# Patient Record
Sex: MALE | Race: WHITE | URBAN - METROPOLITAN AREA
[De-identification: names, ages, dates, MRNs, and addresses within clinical notes are randomized per-mention and may not be internally consistent; named-entity substitution may affect disease eponyms.]

---

## 2020-05-26 ENCOUNTER — OFFICE VISIT (OUTPATIENT)
Dept: URBAN - METROPOLITAN AREA CLINIC 48 | Facility: CLINIC | Age: 75
End: 2020-05-26
Payer: COMMERCIAL

## 2020-05-26 DIAGNOSIS — H40.013 OPEN ANGLE WITH BORDERLINE FINDINGS, LOW RISK, BILATERAL: ICD-10-CM

## 2020-05-26 DIAGNOSIS — H26.491 OTHER SECONDARY CATARACT, RIGHT EYE: Primary | ICD-10-CM

## 2020-05-26 DIAGNOSIS — H25.12 AGE-RELATED NUCLEAR CATARACT, LEFT EYE: ICD-10-CM

## 2020-05-26 DIAGNOSIS — H35.373 PUCKERING OF MACULA, BILATERAL: ICD-10-CM

## 2020-05-26 DIAGNOSIS — H53.2 DIPLOPIA: ICD-10-CM

## 2020-05-26 PROCEDURE — 92004 COMPRE OPH EXAM NEW PT 1/>: CPT | Performed by: OPHTHALMOLOGY

## 2020-05-26 ASSESSMENT — VISUAL ACUITY
OS: 20/30
OD: 20/50

## 2020-05-26 ASSESSMENT — KERATOMETRY
OS: 43.75
OD: 43.63

## 2020-05-26 ASSESSMENT — INTRAOCULAR PRESSURE
OS: 12
OD: 15

## 2020-05-26 NOTE — IMPRESSION/PLAN
Impression: Age-related nuclear cataract, left eye: H25.12. Plan: The patient has a visually significant cataract in the left eye. After discussion with the patient and careful examination it has been determined that a cataract in the left eye is accounting for a significant amount of the patient's visual symptoms. Cataract surgery and the associated risks, benefits, alternatives, expectations, and recovery were discussed in detail with the patient. All questions were answered. The patient understands that there may be some limitation in visual potential given any pre-existing ocular disease. The patient desires cataract surgery in the left eye. patient elects Prednisolone, Ketorolac and Ofloxacin drops for surgery, patient understands side effects. Schedule cataract surgery in the left eye. RL 3 Patient not a candidate for multifoical but candidate for Toric lens work up if desired by patient.

## 2020-05-26 NOTE — IMPRESSION/PLAN
Impression: Dermatochalasis of right upper eyelid: H02.831.  Plan: Refer to Dr. Bandar Stratton with VF ptosis prior

## 2020-05-26 NOTE — IMPRESSION/PLAN
Impression: Other secondary cataract, right eye: H26.491. Plan: Risks, benefits, alternatives, and expectations of YAG capsulotomy were discussed. The patient desires a YAG capsulotomy in the right eye. Schedule YAG capsulotomy in the right eye, with 1-2 day PO1.

## 2020-05-26 NOTE — IMPRESSION/PLAN
Impression: Open angle with borderline findings, low risk, bilateral: H40.013. Plan: Continue to monitor off medication. 


RTC 3 months IOP check with VF 24-2 and OCT nerve prior with Dr. Carlyle Araujo ( long exam)

## 2020-05-26 NOTE — IMPRESSION/PLAN
Impression: Diplopia: H53.2. Primary gaze: Ortho Plan: Patient states he has had this since 2years old, continue to monitor. 

RTC 6 months follow up ( long exam)

## 2020-07-07 ENCOUNTER — TESTING ONLY (OUTPATIENT)
Dept: URBAN - METROPOLITAN AREA CLINIC 48 | Facility: CLINIC | Age: 75
End: 2020-07-07
Payer: COMMERCIAL

## 2020-07-07 PROCEDURE — 92136 OPHTHALMIC BIOMETRY: CPT | Performed by: OPHTHALMOLOGY

## 2020-07-07 ASSESSMENT — PACHYMETRY
OD: 23.79
OS: 2.91
OD: 4.59
OS: 23.71

## 2020-07-21 ENCOUNTER — SURGERY (OUTPATIENT)
Dept: URBAN - METROPOLITAN AREA SURGERY 26 | Facility: SURGERY | Age: 75
End: 2020-07-21
Payer: COMMERCIAL

## 2020-07-21 PROCEDURE — 66984 XCAPSL CTRC RMVL W/O ECP: CPT | Performed by: OPHTHALMOLOGY

## 2020-07-22 ENCOUNTER — POST-OPERATIVE VISIT (OUTPATIENT)
Dept: URBAN - METROPOLITAN AREA CLINIC 48 | Facility: CLINIC | Age: 75
End: 2020-07-22

## 2020-07-22 DIAGNOSIS — Z48.810 ENCNTR FOR SURGICAL AFTCR FOL SURGERY ON THE SENSE ORGANS: ICD-10-CM

## 2020-07-22 PROCEDURE — 99024 POSTOP FOLLOW-UP VISIT: CPT | Performed by: OPTOMETRIST

## 2020-07-22 ASSESSMENT — INTRAOCULAR PRESSURE: OS: 14

## 2020-07-28 ENCOUNTER — POST-OPERATIVE VISIT (OUTPATIENT)
Dept: URBAN - METROPOLITAN AREA CLINIC 48 | Facility: CLINIC | Age: 75
End: 2020-07-28

## 2020-07-28 DIAGNOSIS — Z09 ENCNTR FOR F/U EXAM AFT TRTMT FOR COND OTH THAN MALIG NEOPLM: Primary | ICD-10-CM

## 2020-07-28 PROCEDURE — 99024 POSTOP FOLLOW-UP VISIT: CPT | Performed by: OPTOMETRIST

## 2020-07-28 ASSESSMENT — INTRAOCULAR PRESSURE
OS: 14
OD: 10

## 2020-08-20 ENCOUNTER — SURGERY (OUTPATIENT)
Dept: URBAN - METROPOLITAN AREA SURGERY 26 | Facility: SURGERY | Age: 75
End: 2020-08-20
Payer: COMMERCIAL

## 2020-08-20 PROCEDURE — 66821 AFTER CATARACT LASER SURGERY: CPT | Performed by: OPHTHALMOLOGY

## 2020-08-28 ENCOUNTER — OFFICE VISIT (OUTPATIENT)
Dept: URBAN - METROPOLITAN AREA CLINIC 48 | Facility: CLINIC | Age: 75
End: 2020-08-28
Payer: COMMERCIAL

## 2020-08-28 DIAGNOSIS — H02.834 DERMATOCHALASIS OF LT UPPER EYELID: ICD-10-CM

## 2020-08-28 PROCEDURE — 92014 COMPRE OPH EXAM EST PT 1/>: CPT | Performed by: OPTOMETRIST

## 2020-08-28 PROCEDURE — 92083 EXTENDED VISUAL FIELD XM: CPT | Performed by: OPTOMETRIST

## 2020-08-28 PROCEDURE — 92133 CPTRZD OPH DX IMG PST SGM ON: CPT | Performed by: OPTOMETRIST

## 2020-08-28 ASSESSMENT — INTRAOCULAR PRESSURE
OD: 14
OS: 14

## 2020-08-28 NOTE — IMPRESSION/PLAN
Impression: Open angle with borderline findings, low risk, bilateral: H40.013. Plan: Discuss with patient, nature of disease and associated risk. 

RTC 6 month IOP, VF 24-2, OCT-ON, pachs, OCT Mac, and 5 line raster (prior)

## 2020-08-28 NOTE — IMPRESSION/PLAN
Impression: Dermatochalasis of lt upper eyelid: H02.834. Plan: Discussed with patient.  

RTC 2-3 week lid suyapa, T/UT with Dr. Abhishek Spencer

## 2020-10-06 ENCOUNTER — OFFICE VISIT (OUTPATIENT)
Dept: URBAN - METROPOLITAN AREA CLINIC 48 | Facility: CLINIC | Age: 75
End: 2020-10-06
Payer: COMMERCIAL

## 2020-10-06 DIAGNOSIS — H02.831 DERMATOCHALASIS OF RIGHT UPPER EYELID: Primary | ICD-10-CM

## 2020-10-06 PROCEDURE — 99214 OFFICE O/P EST MOD 30 MIN: CPT | Performed by: OPHTHALMOLOGY

## 2020-10-06 PROCEDURE — 92285 EXTERNAL OCULAR PHOTOGRAPHY: CPT | Performed by: OPHTHALMOLOGY

## 2020-10-06 PROCEDURE — 92081 LIMITED VISUAL FIELD XM: CPT | Performed by: OPHTHALMOLOGY

## 2020-10-06 RX ORDER — ERYTHROMYCIN 5 MG/G
OINTMENT OPHTHALMIC
Qty: 2 | Refills: 0 | Status: ACTIVE
Start: 2020-10-06

## 2020-10-06 RX ORDER — CEPHALEXIN 500 MG/1
500 MG CAPSULE ORAL
Qty: 15 | Refills: 0 | Status: ACTIVE
Start: 2020-10-06

## 2020-10-06 NOTE — IMPRESSION/PLAN
Impression: Dermatochalasis of right upper eyelid: H02.831. Photo Interpretation: supports clinical findings and dx documented in chart. Plan: Discussed diagnosis in detail with patient. Discussed treatment options with patient. Surgical risks and benefits were discussed, explained and understood by patient. Surgical treatment is required. Patient elects to proceed with surgery. HVF 36 superior taped and untaped ordered/reviewed today. If condition is visually significant, Recommend Bilateral Upper Eyelid Blepharoplasty. RL3. *Needs Medical Clearance from Cardiologist due to history of A Fib. Must include stop and start dates for Warfarin. *Patient is currently being treated during COVID-19 epidemic, which limits our availability to establish proper follow up care. Patient understands these limitations, and is aware that we will continue treatment and follow up based on our availability, as determined by local state and federal guidelines.

## 2020-10-06 NOTE — ASSESSMENT/PLAN
Impression: Visual Field - OD: Poor-Untaped: 20 degrees of superior isopter visual field obstruction; Taped: improved visual field obstruction; OS: Poor-Untaped: 20 degrees of superior isopter visual field obstruction; Taped: improved visual field obstruction Plan: See plan #1.

## 2020-11-23 ENCOUNTER — SURGERY (OUTPATIENT)
Dept: URBAN - METROPOLITAN AREA SURGERY 26 | Facility: SURGERY | Age: 75
End: 2020-11-23
Payer: COMMERCIAL

## 2020-11-23 RX ORDER — TRAMADOL HYDROCHLORIDE 50 MG/1
50 MG TABLET, FILM COATED ORAL
Qty: 20 | Refills: 0 | Status: INACTIVE
Start: 2020-11-23 | End: 2020-11-27

## 2020-11-25 ENCOUNTER — POST-OPERATIVE VISIT (OUTPATIENT)
Dept: URBAN - METROPOLITAN AREA CLINIC 48 | Facility: CLINIC | Age: 75
End: 2020-11-25
Payer: COMMERCIAL

## 2020-11-25 DIAGNOSIS — Z48.817: Primary | ICD-10-CM

## 2020-11-25 PROCEDURE — 99024 POSTOP FOLLOW-UP VISIT: CPT | Performed by: STUDENT IN AN ORGANIZED HEALTH CARE EDUCATION/TRAINING PROGRAM

## 2020-11-25 ASSESSMENT — INTRAOCULAR PRESSURE
OS: 18
OD: 17

## 2020-11-25 NOTE — IMPRESSION/PLAN
Impression: Encounter for surgical aftercare following surgery on the skin: Z48.817. Plan: Incisions healing well with mild bleeding, incisions C/D/I, no dehiscence Continue icing surgical areas no more than 15 inutes every hour for the first 48 hours Continue erythromycin carlos TID to surgical site for 1 week, then qhs until gone Postop precuations reviewed with patient RTC 1 week as scheduled

## 2020-12-01 ENCOUNTER — POST-OPERATIVE VISIT (OUTPATIENT)
Dept: URBAN - METROPOLITAN AREA CLINIC 48 | Facility: CLINIC | Age: 75
End: 2020-12-01
Payer: COMMERCIAL

## 2020-12-01 DIAGNOSIS — Z48.89 ENCOUNTER FOR OTHER SPECIFIED SURGICAL AFTERCARE: Primary | ICD-10-CM

## 2020-12-01 PROCEDURE — 99024 POSTOP FOLLOW-UP VISIT: CPT | Performed by: OPHTHALMOLOGY

## 2020-12-01 ASSESSMENT — INTRAOCULAR PRESSURE
OD: 15
OS: 12

## 2020-12-01 NOTE — IMPRESSION/PLAN
Impression: S/P Ptosis  - 8 Days. Encounter for other specified surgical aftercare  Z48.89. Plan: *Patient is currently being treated during COVID-19 epidemic, which limits our availability to establish proper follow up care. Patient understands these limitations, and is aware that we will continue treatment and follow up based on our availability, as determined by local state and federal guidelines. -- Patient to continue Erythromycin carlos at night, for two weeks -- Patient to begin warm compresses BID, for two weeks -- Patient instructed to wash with mild soap and warm water -- Patient to gradually resume normal activities -- Expressed importance of UV Protection

## 2021-02-09 ENCOUNTER — POST-OPERATIVE VISIT (OUTPATIENT)
Dept: URBAN - METROPOLITAN AREA CLINIC 48 | Facility: CLINIC | Age: 76
End: 2021-02-09
Payer: COMMERCIAL

## 2021-02-09 PROCEDURE — 99024 POSTOP FOLLOW-UP VISIT: CPT | Performed by: OPHTHALMOLOGY

## 2021-02-09 NOTE — IMPRESSION/PLAN
Impression: S/P BULB OU - 78 Days. Encounter for other specified surgical aftercare  Z48.89. Post operative instructions reviewed - Plan: *Patient is currently being treated during COVID-19 epidemic, which limits our availability to establish proper follow up care. Patient understands these limitations, and is aware that we will continue treatment and follow up based on our availability, as determined by local state and federal guidelines. -- Patient to gradually resume normal activities -- Expressed importance of UV Protection -- Advised patient to not rub eyes and use warm compresses

## 2023-01-31 ENCOUNTER — OFFICE VISIT (OUTPATIENT)
Dept: URBAN - METROPOLITAN AREA CLINIC 48 | Facility: CLINIC | Age: 78
End: 2023-01-31
Payer: COMMERCIAL

## 2023-01-31 DIAGNOSIS — H26.492 OTHER SECONDARY CATARACT, LEFT EYE: ICD-10-CM

## 2023-01-31 DIAGNOSIS — H02.831 DERMATOCHALASIS OF RIGHT UPPER EYELID: ICD-10-CM

## 2023-01-31 DIAGNOSIS — H35.373 PUCKERING OF MACULA, BILATERAL: Primary | ICD-10-CM

## 2023-01-31 DIAGNOSIS — H02.834 DERMATOCHALASIS OF LEFT UPPER EYELID: ICD-10-CM

## 2023-01-31 PROCEDURE — 92134 CPTRZ OPH DX IMG PST SGM RTA: CPT | Performed by: OPHTHALMOLOGY

## 2023-01-31 PROCEDURE — 99214 OFFICE O/P EST MOD 30 MIN: CPT | Performed by: OPHTHALMOLOGY

## 2023-01-31 ASSESSMENT — INTRAOCULAR PRESSURE
OD: 15
OS: 12

## 2023-01-31 NOTE — IMPRESSION/PLAN
Impression: Other secondary cataract, left eye: H26.492. Plan: Patient reports double vision, Recommend 1-2wk Diplopia work up w/ YAG Eval same day If patient symptoms do not improve with YAG, will move up ERM eval with Dr. Glenis Bassett

## 2023-01-31 NOTE — IMPRESSION/PLAN
Impression: Puckering of macula, bilateral: H35.373. Plan: Unsure if this is cause of visual disturbances.  Recommend eval with Dr. Carri Henry

RTC 2mo ERM Eval w/ Dr. Carri Henry

## 2023-02-15 ENCOUNTER — OFFICE VISIT (OUTPATIENT)
Dept: URBAN - METROPOLITAN AREA CLINIC 48 | Facility: CLINIC | Age: 78
End: 2023-02-15
Payer: COMMERCIAL

## 2023-02-15 DIAGNOSIS — H53.2 DIPLOPIA: ICD-10-CM

## 2023-02-15 DIAGNOSIS — H35.373 PUCKERING OF MACULA, BILATERAL: ICD-10-CM

## 2023-02-15 DIAGNOSIS — H26.493 OTHER SECONDARY CATARACT, BILATERAL: Primary | ICD-10-CM

## 2023-02-15 PROCEDURE — 99214 OFFICE O/P EST MOD 30 MIN: CPT | Performed by: OPHTHALMOLOGY

## 2023-02-15 ASSESSMENT — VISUAL ACUITY
OS: 20/30
OD: 20/30

## 2023-02-15 ASSESSMENT — INTRAOCULAR PRESSURE
OD: 14
OS: 10

## 2023-02-15 ASSESSMENT — KERATOMETRY
OD: 43.50
OS: 43.50

## 2023-02-15 NOTE — IMPRESSION/PLAN
Impression: Diplopia: H53.2. Plan: Ortho on all ACT. Will continue to monitor, may be due to Other secondary cataract.

## 2023-02-15 NOTE — IMPRESSION/PLAN
Impression: Other secondary cataract, bilateral: H26.493. Plan: Risks, benefits, alternatives, and expectations of YAG capsulotomy were discussed. The patient desires a YAG capsulotomy in both eyes.   

Schedule YAG in both eyes same day RL 2
w/ IOP check 30min post YAG OU, and 1wk PO

**advised patient diplopia may be due to needing YAG

## 2023-02-15 NOTE — IMPRESSION/PLAN
Impression: Puckering of macula, bilateral: H35.373.  Plan: Keep appointment as scheduled with Dr. Glenis Bassett

## 2023-03-08 ENCOUNTER — OFFICE VISIT (OUTPATIENT)
Facility: LOCATION | Age: 78
End: 2023-03-08
Payer: COMMERCIAL

## 2023-03-08 DIAGNOSIS — H35.372 PUCKERING OF MACULA, LEFT EYE: Primary | ICD-10-CM

## 2023-03-08 DIAGNOSIS — Z96.1 PRESENCE OF INTRAOCULAR LENS: ICD-10-CM

## 2023-03-08 DIAGNOSIS — H43.812 VITREOUS DEGENERATION, LEFT EYE: ICD-10-CM

## 2023-03-08 DIAGNOSIS — H35.373 PUCKERING OF MACULA, BILATERAL: ICD-10-CM

## 2023-03-08 PROCEDURE — 99204 OFFICE O/P NEW MOD 45 MIN: CPT | Performed by: OPHTHALMOLOGY

## 2023-03-08 PROCEDURE — 92134 CPTRZ OPH DX IMG PST SGM RTA: CPT | Performed by: OPHTHALMOLOGY

## 2023-03-08 ASSESSMENT — INTRAOCULAR PRESSURE
OS: 11
OD: 15

## 2023-03-08 NOTE — IMPRESSION/PLAN
Impression: Puckering of macula, left eye: H35.372. Left. OCT: 
OD: wnl OS: ERM Plan: Examination and OCT reveals an ERM which is distorting the macular contour. The diagnosis, natural history, and prognosis of epiretinal membranes, as well as the risks and benefits of vitrectomy with membrane peeling versus observation were discussed at length. Given the patient's current visual acuity and hindrance on activities of daily living, surgical correction was recommended. The patient understands that while the distortion should rene, the final acuity, which can take months to achieve, may or may not be an improvement over baseline. 

PLAN: 25gm PPV, ERM-ILM peel OS x ERM (30mins non-urgent)

## 2023-03-23 ENCOUNTER — Encounter (OUTPATIENT)
Dept: URBAN - METROPOLITAN AREA SURGERY 25 | Facility: SURGERY | Age: 78
End: 2023-03-23
Payer: COMMERCIAL

## 2023-03-23 ENCOUNTER — LASER (OUTPATIENT)
Dept: URBAN - METROPOLITAN AREA SURGERY 24 | Facility: SURGERY | Age: 78
End: 2023-03-23
Payer: COMMERCIAL

## 2023-04-25 ENCOUNTER — POST-OPERATIVE VISIT (OUTPATIENT)
Dept: URBAN - METROPOLITAN AREA CLINIC 48 | Facility: CLINIC | Age: 78
End: 2023-04-25
Payer: COMMERCIAL

## 2023-04-25 DIAGNOSIS — Z48.810 ENCOUNTER FOR SURGICAL AFTERCARE FOLLOWING SURGERY ON A SENSE ORGAN: Primary | ICD-10-CM

## 2023-04-25 PROCEDURE — 99024 POSTOP FOLLOW-UP VISIT: CPT | Performed by: OPHTHALMOLOGY

## 2023-04-25 ASSESSMENT — INTRAOCULAR PRESSURE
OS: 14
OD: 14

## 2023-04-25 NOTE — IMPRESSION/PLAN
Impression: S/P YAG Capsulotomy (Yttrium Aluminum [place]) OU - 33 Days. Encounter for surgical aftercare following surgery on a sense organ  Z48.810.  Plan: VA improved OU

RTC 6-9mo DE/OCT Mac

## 2023-06-13 NOTE — IMPRESSION/PLAN
Impression: Dermatochalasis of right upper eyelid: H02.831.  Plan: Hx appears improved, no ptosis noted on exam today Altered mental status